# Patient Record
Sex: MALE
[De-identification: names, ages, dates, MRNs, and addresses within clinical notes are randomized per-mention and may not be internally consistent; named-entity substitution may affect disease eponyms.]

---

## 2019-04-22 ENCOUNTER — TRANSCRIPTION ENCOUNTER (OUTPATIENT)
Age: 44
End: 2019-04-22

## 2019-04-22 ENCOUNTER — APPOINTMENT (OUTPATIENT)
Dept: OTOLARYNGOLOGY | Facility: CLINIC | Age: 44
End: 2019-04-22
Payer: COMMERCIAL

## 2019-04-22 DIAGNOSIS — Z78.9 OTHER SPECIFIED HEALTH STATUS: ICD-10-CM

## 2019-04-22 DIAGNOSIS — H65.112 ACUTE AND SUBACUTE ALLERGIC OTITIS MEDIA (MUCOID) (SANGUINOUS) (SEROUS), LEFT EAR: ICD-10-CM

## 2019-04-22 DIAGNOSIS — Z86.69 PERSONAL HISTORY OF OTHER DISEASES OF THE NERVOUS SYSTEM AND SENSE ORGANS: ICD-10-CM

## 2019-04-22 PROBLEM — Z00.00 ENCOUNTER FOR PREVENTIVE HEALTH EXAMINATION: Status: ACTIVE | Noted: 2019-04-22

## 2019-04-22 PROCEDURE — 99203 OFFICE O/P NEW LOW 30 MIN: CPT | Mod: 25

## 2019-04-22 PROCEDURE — 69210 REMOVE IMPACTED EAR WAX UNI: CPT

## 2019-04-23 PROBLEM — H65.112 NON-RECURRENT ACUTE ALLERGIC OTITIS MEDIA OF LEFT EAR: Status: ACTIVE | Noted: 2019-04-23

## 2019-04-23 NOTE — HISTORY OF PRESENT ILLNESS
[de-identified] : This is an initial office for this gentleman whose chief complaint is "clogged left ear".\par He reports that it is not uncommon for him to developed a sensation of a clogged ear. This is generally on the left. This typically comes with an upper respiratory tract infection.\par \par In the last 2 weeks however he has developed this problem and he did not have an antecedent URI. He is not complaining of any tinnitus, vestibular symptoms or otalgia.

## 2019-04-23 NOTE — ASSESSMENT
[FreeTextEntry1] : In addition to cerumen impaction which was removed-he is a left serous otitis media. I offered a myringotomy drainage versus a low burst of oral steroid. He is opted for oral steroids. He will also takes Zantac. I have asked him to avoid alcohol. He will follow up with me in 2 weeks.

## 2019-05-06 ENCOUNTER — APPOINTMENT (OUTPATIENT)
Dept: OTOLARYNGOLOGY | Facility: CLINIC | Age: 44
End: 2019-05-06
Payer: COMMERCIAL

## 2019-05-06 DIAGNOSIS — H69.80 OTHER SPECIFIED DISORDERS OF EUSTACHIAN TUBE, UNSPECIFIED EAR: ICD-10-CM

## 2019-05-06 PROCEDURE — 99213 OFFICE O/P EST LOW 20 MIN: CPT

## 2019-05-06 RX ORDER — METHYLPREDNISOLONE 4 MG/1
4 TABLET ORAL
Qty: 1 | Refills: 0 | Status: DISCONTINUED | COMMUNITY
Start: 2019-04-22 | End: 2019-05-06

## 2019-05-06 NOTE — ASSESSMENT
[FreeTextEntry1] : Clinically stable. The fluid resolved. We talked about the utility of Valsalva maneuvers. We also talked about using Afrin if the problem returns. An Allis use Allegra-IRWIN. He'll follow up with me on an as-needed basis.

## 2019-05-06 NOTE — HISTORY OF PRESENT ILLNESS
[de-identified] : He is seen in followup today. He reports that he had a dramatic and normal seemingly response to oral steroids for the left serous otitis media. His hearing is back to normal.\par \par

## 2020-12-21 PROBLEM — Z86.69 HISTORY OF ACUTE OTITIS MEDIA: Status: RESOLVED | Noted: 2019-04-22 | Resolved: 2020-12-21

## 2022-04-20 NOTE — REVIEW OF SYSTEMS
EXCUSE FOR WORK      2022        Re: Caro Rehman  [address] [facility] [id number]      Please excuse Caro Rehman,  1965 from work on - due to illness.      RESTRICTIONS: none          Electronically signed by VIVI Quigley: CANDACE ,   2022  VIVI Quigley: VIVI Saini: GILMAR   [facility]  [address] [address] [phone]   [Patient Intake Form Reviewed] : Patient intake form was reviewed